# Patient Record
Sex: MALE | Race: WHITE | HISPANIC OR LATINO | URBAN - METROPOLITAN AREA
[De-identification: names, ages, dates, MRNs, and addresses within clinical notes are randomized per-mention and may not be internally consistent; named-entity substitution may affect disease eponyms.]

---

## 2018-07-14 ENCOUNTER — EMERGENCY (EMERGENCY)
Facility: HOSPITAL | Age: 38
LOS: 1 days | Discharge: HOME | End: 2018-07-14
Admitting: EMERGENCY MEDICINE
Payer: SELF-PAY

## 2018-07-14 VITALS
SYSTOLIC BLOOD PRESSURE: 106 MMHG | TEMPERATURE: 98 F | RESPIRATION RATE: 16 BRPM | DIASTOLIC BLOOD PRESSURE: 84 MMHG | HEART RATE: 92 BPM | OXYGEN SATURATION: 99 %

## 2018-07-14 DIAGNOSIS — F10.129 ALCOHOL ABUSE WITH INTOXICATION, UNSPECIFIED: ICD-10-CM

## 2018-07-14 DIAGNOSIS — R41.82 ALTERED MENTAL STATUS, UNSPECIFIED: ICD-10-CM

## 2018-07-14 DIAGNOSIS — Z88.6 ALLERGY STATUS TO ANALGESIC AGENT: ICD-10-CM

## 2018-07-14 NOTE — ED ADULT TRIAGE NOTE - CHIEF COMPLAINT QUOTE
Pt. picked up for unsteady gait, pt. reports drinking alcohol today. Requesting a bed to sleep in. In no acute distress

## 2018-07-14 NOTE — ED ADULT NURSE NOTE - OBJECTIVE STATEMENT
36 yo M BIBA for AMS. Pt admits to etoh use and denies drug use at this time. pt has no sigh of trauma noted to head or body. Pt has [facility] brace[facility] on foot and states that he drinks too much and is homeless. Pt stable at this time with no s.s of acute distress noted. Pt safety maintained and will continue to monitor.

## 2018-07-15 VITALS
DIASTOLIC BLOOD PRESSURE: 81 MMHG | RESPIRATION RATE: 16 BRPM | HEART RATE: 99 BPM | TEMPERATURE: 98 F | OXYGEN SATURATION: 95 % | SYSTOLIC BLOOD PRESSURE: 120 MMHG

## 2018-07-15 NOTE — ED PROVIDER NOTE - MEDICAL DECISION MAKING DETAILS
38 y/o M presents to ED with alcohol intoxication.  Pt unkempt, slurred speech.  fsbs= 105.  Will observe for sobriety. 38 y/o M presents to ED with alcohol intoxication.  Pt unkempt, slurred speech.  fsbs= 105.  No overt signs of trauma.  Will observe for sobriety.

## 2018-07-15 NOTE — ED PROVIDER NOTE - OBJECTIVE STATEMENT
36 y/o M presents to ED with alcohol intoxication.  Pt verbalizes "I drank too much".  He denies illicit drug use. 36 y/o M presents to ED with alcohol intoxication.  Pt verbalizes "I drank too much".  He is undomiciled.  He denies illicit drug use.

## 2025-08-05 NOTE — ED ADULT TRIAGE NOTE - TEMPERATURE IN CELSIUS (DEGREES C)
Copied from CRM #[id number]. Topic: Medications - Medication Refill  >> Aug 5, 2025 10:06 AM Katina wrote:  Type:  RX Refill Request    Who Called: patient at [phone]    Refill or New Rx:Partial Refill    RX Name and Strength: carvediloL (COREG) 25 MG tablet    Preferred Pharmacy with phone number:  [facility] [address], [place] - [address] [facility]  [address] [place] [id number]  Phone: [phone] Fax: [phone]      Additional Information: Pt needs office to send 10-12 tablets, patient only has two left, it will take optum a week to be able to get it the patient. Patient would like a call when it has been sent over to the pharmacy, they are nervous. Please call and advise. Thank you.   36.7